# Patient Record
Sex: MALE | Race: WHITE | Employment: FULL TIME | URBAN - METROPOLITAN AREA
[De-identification: names, ages, dates, MRNs, and addresses within clinical notes are randomized per-mention and may not be internally consistent; named-entity substitution may affect disease eponyms.]

---

## 2017-12-08 ENCOUNTER — OFFICE VISIT (OUTPATIENT)
Dept: FAMILY MEDICINE | Facility: CLINIC | Age: 37
End: 2017-12-08

## 2017-12-08 VITALS
HEIGHT: 69 IN | SYSTOLIC BLOOD PRESSURE: 110 MMHG | OXYGEN SATURATION: 98 % | BODY MASS INDEX: 25.09 KG/M2 | DIASTOLIC BLOOD PRESSURE: 74 MMHG | TEMPERATURE: 98 F | HEART RATE: 67 BPM | WEIGHT: 169.4 LBS

## 2017-12-08 DIAGNOSIS — L65.9 HAIR LOSS: ICD-10-CM

## 2017-12-08 DIAGNOSIS — Z00.00 ENCOUNTER FOR ROUTINE ADULT HEALTH EXAMINATION WITHOUT ABNORMAL FINDINGS: Primary | ICD-10-CM

## 2017-12-08 DIAGNOSIS — H61.23 BILATERAL IMPACTED CERUMEN: ICD-10-CM

## 2017-12-08 DIAGNOSIS — F41.1 GAD (GENERALIZED ANXIETY DISORDER): ICD-10-CM

## 2017-12-08 PROCEDURE — 69209 REMOVE IMPACTED EAR WAX UNI: CPT | Mod: 50 | Performed by: PHYSICIAN ASSISTANT

## 2017-12-08 PROCEDURE — 99395 PREV VISIT EST AGE 18-39: CPT | Mod: 25 | Performed by: PHYSICIAN ASSISTANT

## 2017-12-08 RX ORDER — ELECTROLYTES/DEXTROSE
SOLUTION, ORAL ORAL
COMMUNITY
Start: 2017-12-08 | End: 2019-07-18

## 2017-12-08 RX ORDER — ASCORBIC ACID 500 MG
TABLET ORAL DAILY
Qty: 30 TABLET | COMMUNITY
Start: 2017-12-08 | End: 2019-07-18

## 2017-12-08 RX ORDER — OMEGA-3 FATTY ACIDS/FISH OIL 300-1000MG
1 CAPSULE ORAL DAILY
Qty: 90 CAPSULE | COMMUNITY
Start: 2017-12-08 | End: 2019-07-18

## 2017-12-08 ASSESSMENT — ANXIETY QUESTIONNAIRES
GAD7 TOTAL SCORE: 3
IF YOU CHECKED OFF ANY PROBLEMS ON THIS QUESTIONNAIRE, HOW DIFFICULT HAVE THESE PROBLEMS MADE IT FOR YOU TO DO YOUR WORK, TAKE CARE OF THINGS AT HOME, OR GET ALONG WITH OTHER PEOPLE: NOT DIFFICULT AT ALL
1. FEELING NERVOUS, ANXIOUS, OR ON EDGE: SEVERAL DAYS
3. WORRYING TOO MUCH ABOUT DIFFERENT THINGS: SEVERAL DAYS
7. FEELING AFRAID AS IF SOMETHING AWFUL MIGHT HAPPEN: NOT AT ALL
6. BECOMING EASILY ANNOYED OR IRRITABLE: NOT AT ALL
5. BEING SO RESTLESS THAT IT IS HARD TO SIT STILL: NOT AT ALL
2. NOT BEING ABLE TO STOP OR CONTROL WORRYING: NOT AT ALL

## 2017-12-08 ASSESSMENT — PATIENT HEALTH QUESTIONNAIRE - PHQ9
5. POOR APPETITE OR OVEREATING: SEVERAL DAYS
SUM OF ALL RESPONSES TO PHQ QUESTIONS 1-9: 6

## 2017-12-08 NOTE — PROGRESS NOTES
Chapincito Menjivar is a 37 year old male presents for routine health maintenance.    Current concerns:   Chuckie takes sertraline that is prescribed from psychiatrist. Continues to see psychiatrist, and has seen therapists in the past, but is not currently. Has been taking sertraline 100 mg daily and is tolerating well, happy with the effects. Also, has propranolol to use as needed for higher anxiety episodes. Takes 2 times per week. Would be interested in having me take over on prescribing these.      Also takes finasteride to help with hair growth. He is prescribed this by the provider who did his hair transplant surgery.     Body mass index is 25.38 kg/(m^2).    Present exercise habits:  3-5 times/week, at [employer]  Present dietary habits:  eats regular meals and follows a balanced nutrition diet    Vit D intake: is taking daily multivitamins.     Is the patient a smoker? No  If yes, smoking cessation advised and counseling provided.     Cardiovascular risk factors: none    Over the past few weeks, have you felt down or depressed? Little interest or pleasure in doing things? No    Last dental appointment:  this year  Last optical appointment:  Last year    Was the patient born between 0[birth year] and has not had Hep C testing?  No, not applicable    I have reviewed the following histories: Past Medical History, Past Surgical History, Social History, Family History, Problem List, Medication List and Allergies    Past Medical History:   Diagnosis Date     Dislocated shoulder     left     Family History   Problem Relation Age of Onset     Hypothyroidism Mother      ???     Cataracts Father      Gestational Diabetes Sister      Esophageal Cancer Sister      40     Hyperlipidemia No family hx of      Hypertension No family hx of      DIABETES No family hx of      Colon Cancer No family hx of      Social History     Social History     Marital status: Single     Spouse name: N/A     Number of children: N/A     Years of  "education: N/A     Occupational History           Social History Main Topics     Smoking status: Never Smoker     Smokeless tobacco: Never Used     Alcohol use 0.6 oz/week     1 Standard drinks or equivalent per week     Drug use: No     Sexual activity: Not Currently     Other Topics Concern     Not on file     Social History Narrative     Patient Active Problem List   Diagnosis     Health Care Home     ACP (advance care planning)     CORI (generalized anxiety disorder)     Hair loss     Current Outpatient Prescriptions   Medication     Multiple Vitamins-Minerals (MULTIVITAMIN ADULT) TABS     omega 3 1000 MG CAPS     ascorbic acid (VITAMIN C) 500 MG tablet     finasteride (PROPECIA) 1 MG tablet     propranolol (INDERAL) 10 MG tablet     sertraline (ZOLOFT) 100 MG tablet     No current facility-administered medications for this visit.        Allergies:  No Known Allergies      ROS:  E/M: NEGATIVE for ear, nose, mouth and throat problems  R: NEGATIVE for significant/chronic cough or SOB  CV: NEGATIVE for chest pain or palpitations  GI: NEGATIVE for abdominal pain, chronic diarrhea or constipation  : NEGATIVE for dysuria, hematuria, weakened urinary stream        OBJECTIVE:    Vitals:    12/08/17 1102   BP: 110/74   BP Location: Left arm   Patient Position: Chair   Cuff Size: Adult Large   Pulse: 67   Temp: 98  F (36.7  C)   TempSrc: Oral   SpO2: 98%   Weight: 76.8 kg (169 lb 6.4 oz)   Height: 1.74 m (5' 8.5\")       General: 37 year old male who appears his stated age. Vital signs noted  Head: Normocephalic  Eyes: pupils equal round reactive to light and accomodation, extra ocular movements intact  Ears: external canals and tms free of abnormalities  Nose: patent, without mucosal abnormalities  Mouth and throat: without erythema or lesions of the mucosa  Neck: supple, without adenopathy or thyromegaly  Lungs: clear to auscultation, no wheezing or crackles  Cv: regular rate and rhythm, normal s1 and s2 without " "murmur or click  Abd: soft, non-tender, no masses, no hepatomegaly or splenomegaly.  Gu: Declined  Ms: normal muscle tone & symmetry  Skin: Clear to inspection  Neuro: sensation and motor function grossly intact; cranial nerves without obvious abnormalities.        ASSESSMENT/PLAN:    1. Encounter for routine adult health examination without abnormal findings  Chuckie is doing well. At this time, would like to get records from psychiatrist, but at that point, would most likely be willing to take over prescribing mental health medications. Otherwise recommended fasting labs with next physical.      2. CORI (generalized anxiety disorder)    3. Hair loss    4. Bilateral impacted cerumen  - Removal Impacted Cerumen Irrigation Unilateral (Ear Wash)     reports that he has never smoked. He has never used smokeless tobacco.      Estimated body mass index is 25.38 kg/(m^2) as calculated from the following:    Height as of this encounter: 1.74 m (5' 8.5\").    Weight as of this encounter: 76.8 kg (169 lb 6.4 oz).  Weight management plan: Discussed healthy diet and exercise guidelines and patient will follow up in 6 months in clinic to re-evaluate.      Meds Suggested:      Vitamin D       Calcium  Tests Recommended:      Regular Dental Examinations        Eye exam  Behavior Modifications:       Cardiovascular exercise 3 times per week--enough to get your Target Heart rate  Other recommendations:     BMI noted and discussed      Regular testicle exam     Encouraged My Chart    The patient will return to the clinic if symptoms are changing or concern with follow up as discussed. The patient understands and agrees with the plan.      Chandrika Tello PA-C  12/8/2017          Counseling Resources:  ATP IV Guidelines  Pooled Cohorts Equation Calculator  Breast Cancer Risk Calculator  FRAX Risk Assessment  ICSI Preventive Guidelines  Dietary Guidelines for Americans, 2010  [employer] MyPlate\  "

## 2017-12-08 NOTE — MR AVS SNAPSHOT
"              After Visit Summary   12/8/2017    Chapincito Menjivar    MRN: [id number]           Patient Information     Date Of Birth          1980        Visit Information        Provider Department      12/8/2017 11:00 AM Chandrika Tello PA-C [facility]        Today's Diagnoses     Encounter for routine adult health examination without abnormal findings    -  1    CORI (generalized anxiety disorder)        Hair loss        Bilateral impacted cerumen           Follow-ups after your visit        Follow-up notes from your care team     Return in about 1 year (around 12/8/2018) for Physical Exam.      Who to contact     If you have questions or need follow up information about today's clinic visit or your schedule please contact [facility] directly at [phone].  Normal or non-critical lab and imaging results will be communicated to you by [facility], letter or phone within 4 business days after the clinic has received the results. If you do not hear from us within 7 days, please contact the clinic through MyChart or phone. If you have a critical or abnormal lab result, we will notify you by phone as soon as possible.  Submit refill requests through [facility] or call your pharmacy and they will forward the refill request to us. Please allow 3 business days for your refill to be completed.          Additional Information About Your Visit        [facility] Information     [facility] lets you send messages to your doctor, view your test results, renew your prescriptions, schedule appointments and more. To sign up, go to [email or web address] . Click on \"Log in\" on the left side of the screen, which will take you to the Welcome page. Then click on \"Sign up Now\" on the right side of the page.     You will be asked to enter the access code listed below, as well as some personal information. Please follow the directions to create your username and password.     Your access " "code is: [email or web address]  Expires: 3/8/2018 11:06 AM     Your access code will  in 90 days. If you need help or a new code, please call your [place] clinic or [phone].        Care EveryWhere ID     This is your Care EveryWhere ID. This could be used by other organizations to access your [facility] medical records  [id number]        Your Vitals Were     Pulse Temperature Height Pulse Oximetry BMI (Body Mass Index)       67 98  F (36.7  C) (Oral) 1.74 m (5' 8.5\") 98% 25.38 kg/m2        Blood Pressure from Last 3 Encounters:   17 110/74   16 112/72    Weight from Last 3 Encounters:   17 76.8 kg (169 lb 6.4 oz)   16 73.3 kg (161 lb 9.6 oz)              We Performed the Following     Removal Impacted Cerumen Irrigation Unilateral (Ear Wash)        Primary Care Provider Office Phone # Fax #    Chandrika Betty Tello PA-C [phone]       [facility] [address] [facility] [address]  [facility] [address]        Equal Access to Services     ABEBA OSCAR AH: Hadii aad ku hadasho Soomaali, waaxda luqadaha, qaybta kaalmada adeegyada, waxay tisha richards. So [facility] [phone].    ATENCIÓN: Si habla español, tiene a guerrero disposición servicios gratuitos de asistencia lingüística. Llame al [phone].    We comply with applicable federal civil rights laws and [place] laws. We do not discriminate on the basis of race, color, national origin, age, disability, sex, sexual orientation, or gender identity.            Thank you!     Thank you for choosing [facility]  for your care. Our goal is always to provide you with excellent care. Hearing back from our patients is one way we can continue to improve our services. Please take a few minutes to complete the written survey that you may receive in the mail after your visit with us. Thank you!             Your Updated Medication List - Protect others around you: Learn how to safely use, " store and throw away your medicines at www.disposemymeds.org.          This list is accurate as of: 12/8/17 11:59 PM.  Always use your most recent med list.                   Brand Name Dispense Instructions for use Diagnosis    ascorbic acid 500 MG tablet    VITAMIN C    30 tablet    Take by mouth daily        finasteride 1 MG tablet    PROPECIA     Take 1 mg by mouth daily        MULTIVITAMIN ADULT Tabs           omega 3 1000 MG Caps     90 capsule    Take 1 g by mouth daily        propranolol 10 MG tablet    INDERAL     Take 10 mg by mouth as needed        sertraline 100 MG tablet    ZOLOFT     Take 100 mg by mouth daily

## 2017-12-08 NOTE — NURSING NOTE
Chapincito is here for a PX, pt is not fasting    Patient is here for a full physical exam.    Pre-Visit Screening :  Immunizations : up to date  Colon Screening : na    Asthma Action Test/Plan : na  PHQ9/GAD7 : phq2/ given      Vitals:  Pulse - regular  My Chart - accepts    CLASSIFICATION OF OVERWEIGHT AND OBESITY BY BMI                         Obesity Class           BMI(kg/m2)  Underweight                                    < 18.5  Normal                                         18.5-24.9  Overweight                                     25.0-29.9  OBESITY                     I                  30.0-34.9                              II                 35.0-39.9  EXTREME OBESITY             III                >40                             Patient's  BMI There is no height or weight on file to calculate BMI.  http://hin.nhlbi.nih.gov/menuplanner/menu.cgi  Questioned patient about current smoking habits.  Pt. has never smoked.    ETOH screening:  Questions:  1-How often do you have a drink containing alcohol?                             2 times per week(s)  2-How many drinks containing alcohol do you have on a typical day when you are         Drinking?                              1   3- How often do you have 5 or more drinks on one occasion?                              0 per months    Have you ever:  None of the patient's responses to the CAGE screening were positive / Negative CAGE score     The patient has verbalized that it is ok to leave a detailed voice message on the patient's cell phone with results/recommendations from this visit.       Verified [phone] phone number:

## 2017-12-09 PROBLEM — F41.1 GAD (GENERALIZED ANXIETY DISORDER): Status: ACTIVE | Noted: 2017-12-09

## 2017-12-09 PROBLEM — L65.9 HAIR LOSS: Status: ACTIVE | Noted: 2017-12-09

## 2017-12-09 ASSESSMENT — ANXIETY QUESTIONNAIRES: GAD7 TOTAL SCORE: 3

## 2018-12-13 ENCOUNTER — OFFICE VISIT (OUTPATIENT)
Dept: FAMILY MEDICINE | Facility: CLINIC | Age: 38
End: 2018-12-13

## 2018-12-13 VITALS
WEIGHT: 150 LBS | HEIGHT: 68 IN | TEMPERATURE: 98.5 F | BODY MASS INDEX: 22.73 KG/M2 | HEART RATE: 72 BPM | DIASTOLIC BLOOD PRESSURE: 62 MMHG | SYSTOLIC BLOOD PRESSURE: 118 MMHG

## 2018-12-13 DIAGNOSIS — Z13.220 SCREENING FOR LIPID DISORDERS: ICD-10-CM

## 2018-12-13 DIAGNOSIS — Z00.00 ENCOUNTER FOR GENERAL MEDICAL EXAMINATION: Primary | ICD-10-CM

## 2018-12-13 DIAGNOSIS — Z13.228 SCREENING FOR METABOLIC DISORDER: ICD-10-CM

## 2018-12-13 PROCEDURE — 99395 PREV VISIT EST AGE 18-39: CPT | Performed by: PHYSICIAN ASSISTANT

## 2018-12-13 SDOH — HEALTH STABILITY: MENTAL HEALTH: HOW OFTEN DO YOU HAVE A DRINK CONTAINING ALCOHOL?: 2-3 TIMES A WEEK

## 2018-12-13 SDOH — HEALTH STABILITY: MENTAL HEALTH: HOW MANY STANDARD DRINKS CONTAINING ALCOHOL DO YOU HAVE ON A TYPICAL DAY?: 1 OR 2

## 2018-12-13 ASSESSMENT — ANXIETY QUESTIONNAIRES
6. BECOMING EASILY ANNOYED OR IRRITABLE: SEVERAL DAYS
3. WORRYING TOO MUCH ABOUT DIFFERENT THINGS: SEVERAL DAYS
7. FEELING AFRAID AS IF SOMETHING AWFUL MIGHT HAPPEN: NOT AT ALL
IF YOU CHECKED OFF ANY PROBLEMS ON THIS QUESTIONNAIRE, HOW DIFFICULT HAVE THESE PROBLEMS MADE IT FOR YOU TO DO YOUR WORK, TAKE CARE OF THINGS AT HOME, OR GET ALONG WITH OTHER PEOPLE: NOT DIFFICULT AT ALL
2. NOT BEING ABLE TO STOP OR CONTROL WORRYING: NOT AT ALL
5. BEING SO RESTLESS THAT IT IS HARD TO SIT STILL: NOT AT ALL
GAD7 TOTAL SCORE: 4
1. FEELING NERVOUS, ANXIOUS, OR ON EDGE: SEVERAL DAYS

## 2018-12-13 ASSESSMENT — PATIENT HEALTH QUESTIONNAIRE - PHQ9
SUM OF ALL RESPONSES TO PHQ QUESTIONS 1-9: 2
5. POOR APPETITE OR OVEREATING: SEVERAL DAYS

## 2018-12-13 ASSESSMENT — MIFFLIN-ST. JEOR: SCORE: 1569.9

## 2018-12-13 NOTE — PROGRESS NOTES
Chapincito Menjivar is a 38 year old male presents for routine health maintenance.    Current concerns: None.    Body mass index is 22.81 kg/m .    Present exercise habits:  1-3 times/week  Present dietary habits:  eats regular meals and follows a balanced nutrition diet    Vit D intake: is not taking supplement    Is the patient a smoker? No  If yes, smoking cessation advised and counseling provided.     Cardiovascular risk factors: none    Over the past few weeks, have you felt down or depressed? Little interest or pleasure in doing things? No concerns. Going off sertraline as he is doing so well.    Last dental appointment:  this year  Last optical appointment:  this year    Was the patient born between 8[birth year] and has not had Hep C testing?  No, not applicable    I have reviewed the following histories: Past Medical History, Past Surgical History, Social History, Family History, Problem List, Medication List and Allergies    Past Medical History:   Diagnosis Date     Dislocated shoulder     left     Family History   Problem Relation Age of Onset     Hypothyroidism Mother         ???     Cataracts Father      Gestational Diabetes Sister      Esophageal Cancer Sister         40     Hyperlipidemia No family hx of      Hypertension No family hx of      Diabetes No family hx of      Colon Cancer No family hx of      Prostate Cancer No family hx of      Social History     Socioeconomic History     Marital status: Single     Spouse name: Not on file     Number of children: Not on file     Years of education: Not on file     Highest education level: Not on file   Social Needs     Financial resource strain: Not on file     Food insecurity - worry: Not on file     Food insecurity - inability: Not on file     Transportation needs - medical: Not on file     Transportation needs - non-medical: Not on file   Occupational History     Occupation:    Tobacco Use     Smoking status: Never Smoker     Smokeless tobacco:  "Never Used   Substance and Sexual Activity     Alcohol use: Yes     Alcohol/week: 0.6 oz     Types: 1 Standard drinks or equivalent per week     Frequency: 2-3 times a week     Drinks per session: 1 or 2     Drug use: No     Sexual activity: Yes     Partners: Female     Birth control/protection: Condom   Other Topics Concern     Not on file   Social History Narrative     Not on file     Patient Active Problem List   Diagnosis     Health Care Home     ACP (advance care planning)     CORI (generalized anxiety disorder)     Hair loss     Current Outpatient Medications   Medication     ascorbic acid (VITAMIN C) 500 MG tablet     finasteride (PROPECIA) 1 MG tablet     Multiple Vitamins-Minerals (MULTIVITAMIN ADULT) TABS     omega 3 1000 MG CAPS     propranolol (INDERAL) 10 MG tablet     sertraline (ZOLOFT) 100 MG tablet     No current facility-administered medications for this visit.        Allergies:  No Known Allergies      ROS:  E/M: NEGATIVE for ear, nose, mouth and throat problems  R: NEGATIVE for significant/chronic cough or SOB  CV: NEGATIVE for chest pain or palpitations  GI: NEGATIVE for abdominal pain, chronic diarrhea or constipation  : NEGATIVE for dysuria, hematuria, weakened urinary stream      OBJECTIVE:    Vitals:    12/13/18 1106   BP: 118/62   BP Location: Left arm   Patient Position: Sitting   Cuff Size: Adult Large   Pulse: 72   Temp: 98.5  F (36.9  C)   TempSrc: Oral   Weight: 68 kg (150 lb)   Height: 1.727 m (5' 8\")       General: 38 year old male who appears his stated age. Vital signs noted.  Head: Normocephalic  Eyes: pupils equal round reactive to light and accomodation, extra ocular movements intact  Ears: external canals and tms free of abnormalities  Nose: patent, without mucosal abnormalities  Mouth and throat: without erythema or lesions of the mucosa  Neck: supple, without adenopathy or thyromegaly  Lungs: clear to auscultation, no wheezing or crackles  Cv: regular rate and rhythm, normal " "s1 and s2 without murmur or click  Abd: soft, non-tender, no masses, no hepatomegaly or splenomegaly.  Gu: normal external genitalia, no hernia  Ms: normal muscle tone & symmetry  Skin: Clear to inspection  Neuro: sensation and motor function grossly intact; cranial nerves without obvious abnormalities.        ASSESSMENT/PLAN:    1. Encounter for general medical examination  Chuckie is doing well today. He will return in 2 days fasting for fasting labs. I will return results to him via [employer] as he is planning to sign up for this today. Otherwise, I will send letter.     2. Screening for lipid disorders  - Lipid Profile ([facility]); Standing  - VENOUS COLLECTION; Standing    3. Screening for metabolic disorder  - BASIC METABOLIC PANEL ([facility]); Standing  - VENOUS COLLECTION; Standing     reports that  has never smoked. he has never used smokeless tobacco.      Estimated body mass index is 22.81 kg/m  as calculated from the following:    Height as of this encounter: 1.727 m (5' 8\").    Weight as of this encounter: 68 kg (150 lb).        Labs pending:      Fasting glucose      Fasting lipids  Meds Suggested:      Vitamin D       Calcium  Tests Recommended:      Regular Dental Examinations        Eye exam  Behavior Modifications:       Cardiovascular exercise 3 times per week--enough to get your Target Heart rate  Other recommendations:     BMI noted and discussed      Regular testicle exam     Encouraged My Chart    The patient will return to the clinic if symptoms are changing or concern with follow up as discussed. The patient understands and agrees with the plan.      Chandrika Tello PA-C  12/13/2018          Counseling Resources:  ATP IV Guidelines  Pooled Cohorts Equation Calculator  Breast Cancer Risk Calculator  FRAX Risk Assessment  ICSI Preventive Guidelines  Dietary Guidelines for Americans, 2010  USDA's MyPlate    "

## 2018-12-13 NOTE — NURSING NOTE
Chuckie is here for a non fasting PX          Patient is here for a full physical exam.    Pre-Visit Screening :  Immunizations : up to date  Colon Screening : NA  Mammogram: NA  Asthma Action Test/Plan : NA  PHQ9 :  Done today  GAD7 :  Done today  Patient's  BMI Body mass index is 22.81 kg/m .  Questioned patient about current smoking habits.  Pt. has never smoked.  OK to leave a detailed voice message regarding today's visit Yes, phone # [phone]      ETOH screening:  Questions:  1-How often do you have a drink containing alcohol?                             2 times per week(s)  2-How many drinks containing alcohol do you have on a typical day when you are         Drinking?                              2   3- How often do you have 5 or more drinks on one occasion?                              never

## 2018-12-14 ASSESSMENT — ANXIETY QUESTIONNAIRES: GAD7 TOTAL SCORE: 4

## 2018-12-15 ENCOUNTER — OFFICE VISIT (OUTPATIENT)
Dept: FAMILY MEDICINE | Facility: CLINIC | Age: 38
End: 2018-12-15

## 2018-12-15 DIAGNOSIS — Z13.220 SCREENING FOR LIPID DISORDERS: ICD-10-CM

## 2018-12-15 DIAGNOSIS — Z13.228 SCREENING FOR METABOLIC DISORDER: ICD-10-CM

## 2018-12-15 PROCEDURE — 80061 LIPID PANEL: CPT | Mod: 90 | Performed by: PHYSICIAN ASSISTANT

## 2018-12-15 PROCEDURE — 36415 COLL VENOUS BLD VENIPUNCTURE: CPT | Performed by: PHYSICIAN ASSISTANT

## 2018-12-15 PROCEDURE — 80048 BASIC METABOLIC PNL TOTAL CA: CPT | Mod: 90 | Performed by: PHYSICIAN ASSISTANT

## 2018-12-15 NOTE — LETTER
[facility]  [facility] of [facility]  [facility] [address]  [place]  [address]    December 17, 2018        Chapincito Menjivar  [facility] [address]              Dear Chuckie,    It was nice to see you the other day. All of your lab results have returned.    Your basic metabolic panel (BMP), which looks at your kidney function, electrolyte levels, and fasting glucose (blood sugar) level is within normal limits.    Your lipid/cholesterol profile did show your total and LDL (bad) cholesterol were mildly elevated. The goal for your total cholesterol is <200. The goal for your HDL (good) cholesterol, which is often an indication of exercise, is >40 like yours, but is even better when it is >50. The goal for your LDL (bad) cholesterol is <130. Finally, the goal for your triglycerides, which are usually a reflection of the fat content in your diet, is <150.     Base on these results, I have no immediate concerns, but I would encourage you to work on finding positive ways to change your diet choices and activity level with the goal of weight loss and overall better health. I would like to check these fasting levels again in 1-2 years.     If you have any further questions or problems, please contact our office at [phone].          Chandrika Tello PA-C

## 2018-12-16 LAB
BUN SERPL-MCNC: 16 MG/DL (ref 7–25)
BUN/CREATININE RATIO: NORMAL (CALC) (ref 6–22)
CALCIUM SERPL-MCNC: 9.6 MG/DL (ref 8.6–10.3)
CHLORIDE SERPLBLD-SCNC: 103 MMOL/L (ref 98–110)
CHOLEST SERPL-MCNC: 209 MG/DL
CHOLEST/HDLC SERPL: 4.6 (CALC)
CO2 SERPL-SCNC: 28 MMOL/L (ref 20–32)
CREAT SERPL-MCNC: 1.07 MG/DL (ref 0.6–1.35)
EGFR AFRICAN AMERICAN - QUEST: 101 ML/MIN/1.73M2
GFR SERPL CREATININE-BSD FRML MDRD: 87 ML/MIN/1.73M2
GLUCOSE - QUEST: 88 MG/DL (ref 65–99)
HDLC SERPL-MCNC: 45 MG/DL
LDLC SERPL CALC-MCNC: 148 MG/DL (CALC)
NONHDLC SERPL-MCNC: 164 MG/DL (CALC)
POTASSIUM SERPL-SCNC: 4.8 MMOL/L (ref 3.5–5.3)
SODIUM SERPL-SCNC: 140 MMOL/L (ref 135–146)
TRIGL SERPL-MCNC: 68 MG/DL

## 2019-07-18 ENCOUNTER — OFFICE VISIT (OUTPATIENT)
Dept: FAMILY MEDICINE | Facility: CLINIC | Age: 39
End: 2019-07-18
Payer: COMMERCIAL

## 2019-07-18 VITALS
WEIGHT: 153.4 LBS | HEART RATE: 67 BPM | BODY MASS INDEX: 22.72 KG/M2 | OXYGEN SATURATION: 94 % | DIASTOLIC BLOOD PRESSURE: 74 MMHG | HEIGHT: 69 IN | TEMPERATURE: 98.9 F | SYSTOLIC BLOOD PRESSURE: 122 MMHG | RESPIRATION RATE: 20 BRPM

## 2019-07-18 DIAGNOSIS — N52.9 ERECTILE DYSFUNCTION, UNSPECIFIED ERECTILE DYSFUNCTION TYPE: ICD-10-CM

## 2019-07-18 DIAGNOSIS — F41.1 GAD (GENERALIZED ANXIETY DISORDER): Primary | ICD-10-CM

## 2019-07-18 LAB
BUN SERPL-MCNC: 14.7 MG/DL (ref 7–21)
CALCIUM SERPL-MCNC: 9.5 MG/DL (ref 8.5–10.1)
CHLORIDE SERPLBLD-SCNC: 101 MMOL/L (ref 98–110)
CO2 SERPL-SCNC: 27.6 MMOL/L (ref 20–32)
CREAT SERPL-MCNC: 1 MG/DL (ref 0.7–1.3)
GFR SERPL CREATININE-BSD FRML MDRD: 88 ML/MIN/1.7 M2
GLUCOSE SERPL-MCNC: 98.2 MG'DL (ref 70–99)
POTASSIUM SERPL-SCNC: 4 MMOL/DL (ref 3.2–4.6)
SODIUM SERPL-SCNC: 137.2 MMOL/L (ref 132–142)
TSH SERPL DL<=0.05 MIU/L-ACNC: 2.48 UIU/ML (ref 0.3–5)

## 2019-07-18 RX ORDER — SILDENAFIL 100 MG/1
TABLET, FILM COATED ORAL
Qty: 10 TABLET | Refills: 0 | Status: SHIPPED | OUTPATIENT
Start: 2019-07-18 | End: 2020-10-27

## 2019-07-18 ASSESSMENT — ANXIETY QUESTIONNAIRES
1. FEELING NERVOUS, ANXIOUS, OR ON EDGE: SEVERAL DAYS
GAD7 TOTAL SCORE: 8
2. NOT BEING ABLE TO STOP OR CONTROL WORRYING: SEVERAL DAYS
7. FEELING AFRAID AS IF SOMETHING AWFUL MIGHT HAPPEN: NOT AT ALL
IF YOU CHECKED OFF ANY PROBLEMS ON THIS QUESTIONNAIRE, HOW DIFFICULT HAVE THESE PROBLEMS MADE IT FOR YOU TO DO YOUR WORK, TAKE CARE OF THINGS AT HOME, OR GET ALONG WITH OTHER PEOPLE: SOMEWHAT DIFFICULT
3. WORRYING TOO MUCH ABOUT DIFFERENT THINGS: MORE THAN HALF THE DAYS
6. BECOMING EASILY ANNOYED OR IRRITABLE: SEVERAL DAYS
5. BEING SO RESTLESS THAT IT IS HARD TO SIT STILL: SEVERAL DAYS

## 2019-07-18 ASSESSMENT — PATIENT HEALTH QUESTIONNAIRE - PHQ9
SUM OF ALL RESPONSES TO PHQ QUESTIONS 1-9: 5
5. POOR APPETITE OR OVEREATING: MORE THAN HALF THE DAYS

## 2019-07-18 ASSESSMENT — MIFFLIN-ST. JEOR: SCORE: 1592.07

## 2019-07-18 ASSESSMENT — PAIN SCALES - GENERAL: PAINLEVEL: NO PAIN (0)

## 2019-07-18 NOTE — PROGRESS NOTES
"Subjective: Chapincito Menjivar is a 39 year old who presents today for a few concerns.    He has been diagnosed with generalized anxiety disorder in the past and previously was taking sertraline and episodic propranolol.  He found this somewhat helpful but at this point he is been going to [facility] counseling and this is been more effective.  This is not interfering with work.    More concerning is some degree of erectile dysfunction.  The patient got  in December and is only able to sustain an erection and have ejaculation perhaps 50% of the time when having intercourse with his wife.  He does have nocturnal erections and nocturnal admissions.  He has no history of vascular disease.  Notably, his sister was diagnosed with diabetes at the age of 38 and this is type 2 diabetes.  Other than her there are no family members with high blood pressure, diabetes or vascular disease.    He does feel that performance anxiety is feeding into this problem and at this point he says it is causing some degree of sadness for his wife but not causing marital tension.    PMHX/PSHX/MEDS/ALLERGIES/SHX/FHX reviewed and updated in Epic.   ROS:   General: No fevers, chills   Head: No headache   CV: No chest pain or palpitations.   Resp: No shortness of breath. No cough. No hemoptysis.   GI: No nausea or vomiting.  No constipation or diarrhea.  Objective: /74   Pulse 67   Temp 98.9  F (37.2  C) (Oral)   Resp 20   Ht 1.746 m (5' 8.74\")   Wt 69.6 kg (153 lb 6.4 oz)   SpO2 94%   BMI 22.82 kg/m     Gen: Well nourished and in NAD   CV: RRR - no murmurs, rubs, or gallups  Pulm: Clear to auscultation without wheezing or crackles  ABD: soft, nontender, BS intact  Extrem: no cyanosis, edema or clubbing   Psych: Euthymic    Assessment/ Plan:  1. CORI (generalized anxiety disorder)  Continue with counseling.    2. Erectile dysfunction, unspecified erectile dysfunction type  Check laboratory tests to rule out underlying conditions. "  I did  the patient on steps that he and his wife could take for addressing this problem and also recommended a short trial of Viagra.  I will have him follow-up in a month.  - Thyroid [place] ([facility])  - Testosterone F / T ([facility])  - Basic Metabolic Panel ([facility])  - Results < 1 hr  - sildenafil (VIAGRA) 100 MG tablet; Take 1/2 to 1 pill as needed.  Dispense: 10 tablet; Refill: 0    Total of 35 minutes was spent in face to face contact with patient with > 50% in counseling and coordination of care.  Options for treatment and/or follow-up care were reviewed with the patient. Chapincito Menjivar was engaged and actively involved in the decision making process. He verbalized understanding of the options discussed and was satisfied with the final plan.    Giorgio Cali

## 2019-07-18 NOTE — LETTER
July 23, 2019      Chapincito Menjivar  [address] [facility] [address]        Dear Chapincito,    Please see below for your test results.    All of the blood test came back normal.  There is a normal testosterone, normal thyroid, and normal kidney function.  Please follow-up in the clinic in about a month. These results are within the normal range for you.  Please follow up in the clinic in the next month.     Resulted Orders   Thyroid [place] ([facility])   Result Value Ref Range    TSH 2.48 0.30 - 5.00 uIU/mL    Narrative    Test performed by:  [facility]  [address] [place] [address]   Testosterone F / T ([facility])   Result Value Ref Range    Testosterone Total 563 240 - 950 ng/dL      Comment:      This test was developed and its performance characteristics determined by the  [facility],  [facility]. It has  not been cleared or approved by the FDA. The laboratory is regulated under   CLIA  as qualified to perform high-complexity testing. This test is used for   clinical  purposes. It should not be regarded as investigational or for research.      SEX HORMONE BIND GLOBULIN 41 11 - 80 nmol/L    FREE TESTOSTERONE CALC 10.52 4.7 - 24.4 ng/dL      Comment:      Performed and/or entered by:  [address] [address]       Narrative    Test performed by:  [id number]  [address], [place] [address]   Basic Metabolic Panel ([facility])  - Results < 1 hr   Result Value Ref Range    Urea Nitrogen 14.7 7.0 - 21.0 mg/dL    Calcium 9.5 8.5 - 10.1 mg/dL    Chloride 101.0 98.0 - 110.0 mmol/L    Carbon Dioxide 27.6 20.0 - 32.0 mmol/L    Creatinine 1.0 0.7 - 1.3 mg/dL    Glucose 98.2 70.0 - 99.0 mg'dL    Potassium 4.0 3.2 - 4.6 mmol/dL    Sodium 137.2 132.0 - 142.0 mmol/L    GFR Estimate 88.0 >60.0 mL/min/1.7 m2    GFR Estimate If Black >90 >60.0 mL/min/1.7 m2       If  you have any questions, please call the clinic to make an appointment.    Sincerely,    Giorgio Cali MD

## 2019-07-19 ASSESSMENT — ANXIETY QUESTIONNAIRES: GAD7 TOTAL SCORE: 8

## 2019-07-22 NOTE — RESULT ENCOUNTER NOTE
These results are within the normal range for you.  Please follow up in the clinic in the next month.

## 2019-07-23 LAB
FREE TESTOSTERONE CALC: 10.52 NG/DL (ref 4.7–24.4)
SEX HORMONE BIND GLOBULIN: 41 NMOL/L (ref 11–80)
TESTOST SERPL-MCNC: 563 NG/DL (ref 240–950)

## 2019-07-23 NOTE — RESULT ENCOUNTER NOTE
All of the blood test came back normal.  There is a normal testosterone, normal thyroid, and normal kidney function.  Please follow-up in the clinic in about a month.

## 2020-04-29 ENCOUNTER — TELEPHONE (OUTPATIENT)
Dept: FAMILY MEDICINE | Facility: CLINIC | Age: 40
End: 2020-04-29

## 2020-04-29 NOTE — TELEPHONE ENCOUNTER
Covid-19 outreach call  Pt called and a message left stating that we are open to help with any healthcare needs.

## 2020-10-27 ENCOUNTER — OFFICE VISIT (OUTPATIENT)
Dept: FAMILY MEDICINE | Facility: CLINIC | Age: 40
End: 2020-10-27

## 2020-10-27 VITALS
DIASTOLIC BLOOD PRESSURE: 60 MMHG | SYSTOLIC BLOOD PRESSURE: 112 MMHG | TEMPERATURE: 99.1 F | BODY MASS INDEX: 23.11 KG/M2 | WEIGHT: 156 LBS | OXYGEN SATURATION: 98 % | HEART RATE: 71 BPM | HEIGHT: 69 IN

## 2020-10-27 DIAGNOSIS — N52.9 ERECTILE DYSFUNCTION, UNSPECIFIED ERECTILE DYSFUNCTION TYPE: ICD-10-CM

## 2020-10-27 DIAGNOSIS — Z00.00 ROUTINE GENERAL MEDICAL EXAMINATION AT A HEALTH CARE FACILITY: Primary | ICD-10-CM

## 2020-10-27 PROCEDURE — 99396 PREV VISIT EST AGE 40-64: CPT | Performed by: PHYSICIAN ASSISTANT

## 2020-10-27 RX ORDER — SILDENAFIL 100 MG/1
TABLET, FILM COATED ORAL
Qty: 10 TABLET | Refills: 1 | Status: SHIPPED | OUTPATIENT
Start: 2020-10-27 | End: 2021-11-18

## 2020-10-27 SDOH — HEALTH STABILITY: MENTAL HEALTH: HOW OFTEN DO YOU HAVE A DRINK CONTAINING ALCOHOL?: 2-4 TIMES A MONTH

## 2020-10-27 SDOH — HEALTH STABILITY: MENTAL HEALTH: HOW OFTEN DO YOU HAVE 6 OR MORE DRINKS ON ONE OCCASION?: NOT ASKED

## 2020-10-27 ASSESSMENT — MIFFLIN-ST. JEOR: SCORE: 1595.05

## 2020-10-27 NOTE — NURSING NOTE
Chuckie is here for a non fasting px.        Pre-visit Screening:  Immunizations:  up to date  Colonoscopy:  NA  Mammogram: NA  Asthma Action Test/Plan:  NA  PHQ9:  NA  GAD7:  NA  Questioned patient about current smoking habits Pt. has never smoked.  Ok to leave detailed message on voice mail for today's visit only Yes, phone # cell

## 2020-10-27 NOTE — PROGRESS NOTES
Chapincito Menjivar is a 40 year old male presents for routine health maintenance.    Current concerns: No concerns other than would like refills of sildenafil. This works well when needed.     Body mass index is 23.37 kg/m .    Present exercise habits:  3-5 times/week  Present dietary habits:  eats regular meals and follows a balanced nutrition diet    Vit D intake: is not taking supplement    Is the patient a smoker? No  If yes, smoking cessation advised and counseling provided.     Cardiovascular risk factors: lipids    Over the past few weeks, have you felt down or depressed? Little interest or pleasure in doing things? No concern     Last dental appointment:  this year  Last optical appointment:  2 years ago    Was the patient born between [birth year] and has not had Hep C testing?  No, not applicable    I have reviewed the following histories: Past Medical History, Past Surgical History, Social History, Family History, Problem List, Medication List and Allergies    Past Medical History:   Diagnosis Date     Anxiety      Dislocated shoulder     left     Gastroesophageal reflux disease      Family History   Problem Relation Age of Onset     Hypothyroidism Mother         ???     Cataracts Father      Gestational Diabetes Sister      Diabetes Sister      No Known Problems Brother      Esophageal Cancer Sister         40     No Known Problems Maternal Grandmother      No Known Problems Maternal Grandfather      Cancer Paternal Grandmother         unknown     No Known Problems Other      No Known Problems Paternal Grandfather      Hyperlipidemia No family hx of      Hypertension No family hx of      Colon Cancer No family hx of      Prostate Cancer No family hx of      Coronary Artery Disease No family hx of      Heart Disease No family hx of      Kidney Disease No family hx of      Cerebrovascular Disease No family hx of      Obesity No family hx of      Thrombosis No family hx of      Asthma No family hx of       Arthritis No family hx of      Thyroid Disease No family hx of      Depression No family hx of      Mental Illness No family hx of      Substance Abuse No family hx of      Cystic Fibrosis No family hx of      Early Death No family hx of      Coronary Artery Disease Early Onset No family hx of      Heart Failure No family hx of      Bleeding Diathesis No family hx of      Dementia No family hx of      Breast Cancer No family hx of      Ovarian Cancer No family hx of      Uterine Cancer No family hx of      Colorectal Cancer No family hx of      Pancreatic Cancer No family hx of      Lung Cancer No family hx of      Melanoma No family hx of      Autoimmune Disease No family hx of      Unknown/Adopted No family hx of      Genetic Disorder No family hx of      Social History     Socioeconomic History     Marital status: Single     Spouse name: Not on file     Number of children: Not on file     Years of education: Not on file     Highest education level: Not on file   Occupational History     Occupation:    Social Needs     Financial resource strain: Not on file     Food insecurity     Worry: Not on file     Inability: Not on file     Transportation needs     Medical: Not on file     Non-medical: Not on file   Tobacco Use     Smoking status: Never Smoker     Smokeless tobacco: Never Used   Substance and Sexual Activity     Alcohol use: Yes     Alcohol/week: 1.0 standard drinks     Types: 1 Standard drinks or equivalent per week     Frequency: 2-4 times a month     Drinks per session: 1 or 2     Comment: Socially      Drug use: No     Sexual activity: Yes     Partners: Female     Birth control/protection: Condom     Comment: wife   Lifestyle     Physical activity     Days per week: Not on file     Minutes per session: Not on file     Stress: Not on file   Relationships     Social connections     Talks on phone: Not on file     Gets together: Not on file     Attends Hoahaoism service: Not on file     Active  "member of club or organization: Not on file     Attends meetings of clubs or organizations: Not on file     Relationship status: Not on file     Intimate partner violence     Fear of current or ex partner: Not on file     Emotionally abused: Not on file     Physically abused: Not on file     Forced sexual activity: Not on file   Other Topics Concern     Not on file   Social History Narrative     Not on file     Patient Active Problem List   Diagnosis     Health Care Home     ACP (advance care planning)     CORI (generalized anxiety disorder)     Hair loss     Current Outpatient Medications   Medication     sildenafil (VIAGRA) 100 MG tablet     No current facility-administered medications for this visit.        Allergies:  No Known Allergies    ROS:  E/M: NEGATIVE for ear, nose, mouth and throat problems  R: NEGATIVE for significant/chronic cough or SOB  CV: NEGATIVE for chest pain or palpitations  GI: NEGATIVE for abdominal pain, chronic diarrhea or constipation  : NEGATIVE for dysuria, hematuria, weakened urinary stream      OBJECTIVE:    Vitals:    10/27/20 1220   BP: 112/60   BP Location: Left arm   Pulse: 71   Temp: 99.1  F (37.3  C)   TempSrc: Oral   SpO2: 98%   Weight: 70.8 kg (156 lb)   Height: 1.74 m (5' 8.5\")       General: 40 year old male who appears his stated age. Vital signs noted  Head: Normocephalic  Eyes: pupils equal round reactive to light and accomodation, extra ocular movements intact  Ears: external canals and tms free of abnormalities  Nose: patent, without mucosal abnormalities  Mouth and throat: without erythema or lesions of the mucosa  Neck: supple, without adenopathy or thyromegaly  Lungs: clear to auscultation, no wheezing or crackles  Cv: regular rate and rhythm, normal s1 and s2 without murmur or click  Abd: soft, non-tender, no masses, no hepatomegaly or splenomegaly.  Gu: normal external genitalia, no hernia  Rectal: Not indicated  Ms: normal muscle tone & symmetry  Skin: Clear to " "inspection  Neuro: sensation and motor function grossly intact; cranial nerves without obvious abnormalities.        ASSESSMENT/PLAN:    1. Routine general medical examination at a health care facility  Chuckie is doing well today. Considered rechecking fasting labs today, but agreed to wait until next year as patient is not fasting today. Recommended flu shot especially since he and his wife are trying to get pregnant, but patient declined.     2. Erectile dysfunction, unspecified erectile dysfunction type  Agreed to refill.   - sildenafil (VIAGRA) 100 MG tablet; Take 0.5-1 ( mg) tablets by mouth 1 hour prior to intercourse. Max dose: 2 tabs/24 hours.  Dispense: 10 tablet; Refill: 1     reports that he has never smoked. He has never used smokeless tobacco.    Estimated body mass index is 23.37 kg/m  as calculated from the following:    Height as of this encounter: 1.74 m (5' 8.5\").    Weight as of this encounter: 70.8 kg (156 lb).    Meds Suggested:      Vitamin D       Calcium  Tests Recommended:      Regular Dental Examinations        Eye exam  Behavior Modifications:       Cardiovascular exercise 3 times per week--enough to get your Target Heart rate  Other recommendations:     BMI noted and discussed      Regular testicle exam     Encouraged My Chart    The patient will return to the clinic if symptoms are changing or concern with follow up as discussed. The patient understands and agrees with the plan.      Chandrika Tello PA-C  10/27/2020          Counseling Resources:  ATP IV Guidelines  Pooled Cohorts Equation Calculator  Breast Cancer Risk Calculator  FRAX Risk Assessment  ICSI Preventive Guidelines  Dietary Guidelines for Americans, 2010  USDA's MyPlate    "

## 2021-06-12 ENCOUNTER — IMMUNIZATION (OUTPATIENT)
Dept: LAB | Facility: CLINIC | Age: 41
End: 2021-06-12
Payer: COMMERCIAL

## 2021-09-26 ENCOUNTER — HEALTH MAINTENANCE LETTER (OUTPATIENT)
Age: 41
End: 2021-09-26

## 2021-11-18 ENCOUNTER — OFFICE VISIT (OUTPATIENT)
Dept: FAMILY MEDICINE | Facility: CLINIC | Age: 41
End: 2021-11-18

## 2021-11-18 VITALS
HEART RATE: 69 BPM | OXYGEN SATURATION: 98 % | HEIGHT: 68 IN | BODY MASS INDEX: 22.88 KG/M2 | TEMPERATURE: 97.8 F | SYSTOLIC BLOOD PRESSURE: 110 MMHG | DIASTOLIC BLOOD PRESSURE: 68 MMHG | WEIGHT: 151 LBS

## 2021-11-18 DIAGNOSIS — Z00.00 ENCOUNTER FOR GENERAL MEDICAL EXAMINATION: Primary | ICD-10-CM

## 2021-11-18 DIAGNOSIS — N52.9 ERECTILE DYSFUNCTION, UNSPECIFIED ERECTILE DYSFUNCTION TYPE: ICD-10-CM

## 2021-11-18 DIAGNOSIS — F98.8 ATTENTION DEFICIT DISORDER (ADD) WITHOUT HYPERACTIVITY: ICD-10-CM

## 2021-11-18 PROCEDURE — 99396 PREV VISIT EST AGE 40-64: CPT | Performed by: PHYSICIAN ASSISTANT

## 2021-11-18 RX ORDER — SILDENAFIL 100 MG/1
TABLET, FILM COATED ORAL
Qty: 15 TABLET | Refills: 3 | Status: SHIPPED | OUTPATIENT
Start: 2021-11-18

## 2021-11-18 RX ORDER — DEXTROAMPHETAMINE SACCHARATE, AMPHETAMINE ASPARTATE MONOHYDRATE, DEXTROAMPHETAMINE SULFATE AND AMPHETAMINE SULFATE 3.75; 3.75; 3.75; 3.75 MG/1; MG/1; MG/1; MG/1
CAPSULE, EXTENDED RELEASE ORAL
COMMUNITY
Start: 2021-11-04 | End: 2022-12-23

## 2021-11-18 ASSESSMENT — MIFFLIN-ST. JEOR: SCORE: 1559.43

## 2021-11-18 NOTE — PROGRESS NOTES
Chapincito Menjivar is a 41 year old male presents for routine health maintenance.    Current concerns:   ADD:  Recently started with Adderall XR 15 mg.     Body mass index is 22.96 kg/m .    Present exercise habits:  3-5 times/week  Present dietary habits:  eats regular meals and follows a balanced nutrition diet    Vit D intake: is not taking supplement    Is the patient a smoker? No  If yes, smoking cessation advised and counseling provided.     Cardiovascular risk factors: none    Over the past few weeks, have you felt down or depressed? Little interest or pleasure in doing things? No concerns    Last dental appointment:  this year  Last optical appointment:  this year    Was the patient born between [birth year] and has not had Hep C testing?  Has not been tested, declines testing    I have reviewed the following histories: Past Medical History, Past Surgical History, Social History, Family History, Problem List, Medication List and Allergies    Past Medical History:   Diagnosis Date     Anxiety      Dislocated shoulder     left     Gastroesophageal reflux disease      Family History   Problem Relation Age of Onset     Hypothyroidism Mother         ???     Cataracts Father      Gestational Diabetes Sister      Diabetes Sister      No Known Problems Brother      Esophageal Cancer Sister         40     No Known Problems Maternal Grandmother      No Known Problems Maternal Grandfather      Cancer Paternal Grandmother         unknown     No Known Problems Other      No Known Problems Paternal Grandfather      Hyperlipidemia No family hx of      Hypertension No family hx of      Colon Cancer No family hx of      Prostate Cancer No family hx of      Coronary Artery Disease No family hx of      Heart Disease No family hx of      Kidney Disease No family hx of      Cerebrovascular Disease No family hx of      Obesity No family hx of      Thrombosis No family hx of      Asthma No family hx of      Arthritis No family hx of       Thyroid Disease No family hx of      Depression No family hx of      Mental Illness No family hx of      Substance Abuse No family hx of      Cystic Fibrosis No family hx of      Early Death No family hx of      Coronary Artery Disease Early Onset No family hx of      Heart Failure No family hx of      Bleeding Diathesis No family hx of      Dementia No family hx of      Breast Cancer No family hx of      Ovarian Cancer No family hx of      Uterine Cancer No family hx of      Colorectal Cancer No family hx of      Pancreatic Cancer No family hx of      Lung Cancer No family hx of      Melanoma No family hx of      Autoimmune Disease No family hx of      Unknown/Adopted No family hx of      Genetic Disorder No family hx of      Social History     Socioeconomic History     Marital status:      Spouse name: Not on file     Number of children: Not on file     Years of education: Not on file     Highest education level: Not on file   Occupational History     Occupation:    Tobacco Use     Smoking status: Never Smoker     Smokeless tobacco: Never Used   Substance and Sexual Activity     Alcohol use: Yes     Alcohol/week: 1.0 standard drink     Types: 1 Standard drinks or equivalent per week     Comment: Socially      Drug use: No     Sexual activity: Yes     Partners: Female     Birth control/protection: Natural Family Planning     Comment: wife   Other Topics Concern     Not on file   Social History Narrative     Not on file     Social Determinants of Health     Financial Resource Strain: Not on file   Food Insecurity: Not on file   Transportation Needs: Not on file   Physical Activity: Not on file   Stress: Not on file   Social Connections: Not on file   Intimate Partner Violence: Not on file   Housing Stability: Not on file     Patient Active Problem List   Diagnosis     Health Care Home     ACP (advance care planning)     CORI (generalized anxiety disorder)     Hair loss     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       Allergies:  No Known Allergies      ROS:  E/M: NEGATIVE for ear, nose, mouth and throat problems  R: NEGATIVE for significant/chronic cough or SOB  CV: NEGATIVE for chest pain or palpitations  GI: NEGATIVE for abdominal pain, chronic diarrhea or constipation  : NEGATIVE for dysuria, hematuria, weakened urinary stream      OBJECTIVE:    Vitals:    11/18/21 1311   BP: 110/68   BP Location: Right arm   Patient Position: Sitting   Cuff Size: Adult Large   Pulse: 69   Temp: 97.8  F (36.6  C)   TempSrc: Temporal   SpO2: 98%   Weight: 68.5 kg (151 lb)   Height: 1.727 m (5' 8\")       General: 41 year old male who appears his stated age. Vital signs noted.  Head: Normocephalic  Eyes: pupils equal round reactive to light and accomodation, extra ocular movements intact  Ears: external canals and tms free of abnormalities  Nose: patent, without mucosal abnormalities  Mouth and throat: without erythema or lesions of the mucosa  Neck: supple, without adenopathy or thyromegaly  Lungs: clear to auscultation, no wheezing or crackles  Cv: regular rate and rhythm, normal s1 and s2 without murmur or click  Abd: soft, non-tender, no masses, no hepatomegaly or splenomegaly.  Gu: normal external genitalia, no hernia  Rectal: No concerns  Ms: normal muscle tone & symmetry  Skin: Clear to inspection  Neuro: sensation and motor function grossly intact; cranial nerves without obvious abnormalities.      ASSESSMENT/PLAN:    Encounter for general medical examination  Could consider fasting labs, but agreed that he can wait until next year.     Erectile dysfunction, unspecified erectile dysfunction type  Working well. Will refill as needed.   - sildenafil (VIAGRA) 100 MG tablet; Take " "0.5-1 ( mg) tablets by mouth 1 hour prior to intercourse. Max dose: 2 tabs/24 hours.    Attention deficit disorder (ADD) without hyperactivity     reports that he has never smoked. He has never used smokeless tobacco.    Estimated body mass index is 22.96 kg/m  as calculated from the following:    Height as of this encounter: 1.727 m (5' 8\").    Weight as of this encounter: 68.5 kg (151 lb).    Labs pending:   Meds Suggested:      Vitamin D       Calcium    The patient will return to the clinic if symptoms are changing or concern with follow up as discussed. The patient understands and agrees with the plan.    Chandrika Duran PA-C  11/18/2021      Counseling Resources:  ATP IV Guidelines  Pooled Cohorts Equation Calculator  Breast Cancer Risk Calculator  FRAX Risk Assessment  ICSI Preventive Guidelines  Dietary Guidelines for Americans, 2010  [employer]'s MyPlate    "

## 2021-12-08 ENCOUNTER — IMMUNIZATION (OUTPATIENT)
Dept: NURSING | Facility: CLINIC | Age: 41
End: 2021-12-08
Payer: COMMERCIAL

## 2021-12-08 PROCEDURE — 0004A PR COVID VAC PFIZER DIL RECON 30 MCG/0.3 ML IM: CPT

## 2021-12-08 PROCEDURE — 91300 PR COVID VAC PFIZER DIL RECON 30 MCG/0.3 ML IM: CPT

## 2022-12-23 ENCOUNTER — OFFICE VISIT (OUTPATIENT)
Dept: FAMILY MEDICINE | Facility: CLINIC | Age: 42
End: 2022-12-23

## 2022-12-23 VITALS
SYSTOLIC BLOOD PRESSURE: 130 MMHG | BODY MASS INDEX: 23.19 KG/M2 | OXYGEN SATURATION: 96 % | HEART RATE: 71 BPM | TEMPERATURE: 97.2 F | DIASTOLIC BLOOD PRESSURE: 70 MMHG | HEIGHT: 68 IN | WEIGHT: 153 LBS

## 2022-12-23 DIAGNOSIS — Z13.228 SCREENING FOR METABOLIC DISORDER: ICD-10-CM

## 2022-12-23 DIAGNOSIS — Z00.00 ENCOUNTER FOR GENERAL HEALTH EXAMINATION: Primary | ICD-10-CM

## 2022-12-23 DIAGNOSIS — Z13.220 SCREENING FOR LIPID DISORDERS: ICD-10-CM

## 2022-12-23 LAB
ALBUMIN SERPL-MCNC: 4.9 G/DL (ref 3.6–5.1)
ALBUMIN/GLOB SERPL: 1.9 {RATIO} (ref 1–2.5)
ALP SERPL-CCNC: 71 U/L (ref 33–130)
ALT 1742-6: 13 U/L (ref 0–32)
AST 1920-8: 16 U/L (ref 0–35)
BILIRUB SERPL-MCNC: 1 MG/DL (ref 0.2–1.2)
BUN SERPL-MCNC: 13 MG/DL (ref 7–25)
BUN/CREATININE RATIO: 11.7 (ref 6–22)
CALCIUM SERPL-MCNC: 9.8 MG/DL (ref 8.6–10.3)
CHLORIDE SERPLBLD-SCNC: 101.2 MMOL/L (ref 98–110)
CHOLEST SERPL-MCNC: 264 MG/DL (ref 0–199)
CHOLEST/HDLC SERPL: 4 {RATIO} (ref 0–5)
CO2 SERPL-SCNC: 31.7 MMOL/L (ref 20–32)
CREAT SERPL-MCNC: 1.11 MG/DL (ref 0.6–1.3)
GLOBULIN, CALCULATED - QUEST: 2.6 (ref 1.9–3.7)
GLUCOSE SERPL-MCNC: 94 MG/DL (ref 60–99)
HDLC SERPL-MCNC: 61 MG/DL (ref 40–150)
LDLC SERPL CALC-MCNC: 191 MG/DL (ref 0–130)
POTASSIUM SERPL-SCNC: 4.98 MMOL/L (ref 3.5–5.3)
PROT SERPL-MCNC: 7.5 G/DL (ref 6.1–8.1)
SODIUM SERPL-SCNC: 139.4 MMOL/L (ref 135–146)
TRIGL SERPL-MCNC: 58 MG/DL (ref 0–149)

## 2022-12-23 PROCEDURE — 99396 PREV VISIT EST AGE 40-64: CPT | Performed by: PHYSICIAN ASSISTANT

## 2022-12-23 PROCEDURE — 80053 COMPREHEN METABOLIC PANEL: CPT | Performed by: PHYSICIAN ASSISTANT

## 2022-12-23 PROCEDURE — 80061 LIPID PANEL: CPT | Performed by: PHYSICIAN ASSISTANT

## 2022-12-23 PROCEDURE — 36415 COLL VENOUS BLD VENIPUNCTURE: CPT | Performed by: PHYSICIAN ASSISTANT

## 2022-12-23 RX ORDER — ESCITALOPRAM OXALATE 10 MG/1
10 TABLET ORAL DAILY
COMMUNITY
Start: 2022-11-13

## 2022-12-23 RX ORDER — DEXTROAMPHETAMINE SACCHARATE, AMPHETAMINE ASPARTATE MONOHYDRATE, DEXTROAMPHETAMINE SULFATE AND AMPHETAMINE SULFATE 7.5; 7.5; 7.5; 7.5 MG/1; MG/1; MG/1; MG/1
30 CAPSULE, EXTENDED RELEASE ORAL DAILY
COMMUNITY
Start: 2022-11-13

## 2022-12-23 RX ORDER — PROPRANOLOL HYDROCHLORIDE 20 MG/1
20 TABLET ORAL 2 TIMES DAILY PRN
COMMUNITY
Start: 2022-11-13

## 2022-12-23 ASSESSMENT — ANXIETY QUESTIONNAIRES
IF YOU CHECKED OFF ANY PROBLEMS ON THIS QUESTIONNAIRE, HOW DIFFICULT HAVE THESE PROBLEMS MADE IT FOR YOU TO DO YOUR WORK, TAKE CARE OF THINGS AT HOME, OR GET ALONG WITH OTHER PEOPLE: SOMEWHAT DIFFICULT
1. FEELING NERVOUS, ANXIOUS, OR ON EDGE: SEVERAL DAYS
GAD7 TOTAL SCORE: 9
5. BEING SO RESTLESS THAT IT IS HARD TO SIT STILL: MORE THAN HALF THE DAYS
2. NOT BEING ABLE TO STOP OR CONTROL WORRYING: SEVERAL DAYS
3. WORRYING TOO MUCH ABOUT DIFFERENT THINGS: SEVERAL DAYS
6. BECOMING EASILY ANNOYED OR IRRITABLE: MORE THAN HALF THE DAYS
GAD7 TOTAL SCORE: 9
7. FEELING AFRAID AS IF SOMETHING AWFUL MIGHT HAPPEN: NOT AT ALL

## 2022-12-23 ASSESSMENT — PATIENT HEALTH QUESTIONNAIRE - PHQ9
5. POOR APPETITE OR OVEREATING: MORE THAN HALF THE DAYS
SUM OF ALL RESPONSES TO PHQ QUESTIONS 1-9: 3

## 2022-12-23 NOTE — PROGRESS NOTES
Chapincito Menjivar is a 42 year old male presents for routine health maintenance.    Current concerns:   Anxiety, ADD managed through [facility]. Dr. Abrams. Every 3 months. Stable on Adderall, escitalopram, propranolol.     Body mass index is 23.09 kg/m .    Present exercise habits:  3-5 times/week  Present dietary habits:  eats regular meals and follows a balanced nutrition diet    Vit D intake: is not taking supplement    Is the patient a smoker? No  If yes, smoking cessation advised and counseling provided.     Cardiovascular risk factors: none    Over the past few weeks, have you felt down or depressed? Little interest or pleasure in doing things? No concerns    Last dental appointment:  this year  Last optical appointment:  2 years ago    Was the patient born between [id number] and has not had Hep C testing?  No, not applicable    I have reviewed the following histories: Past Medical History, Past Surgical History, Social History, Family History, Problem List, Medication List and Allergies    Past Medical History:   Diagnosis Date     Anxiety      Dislocated shoulder     left     Gastroesophageal reflux disease      Family History   Problem Relation Age of Onset     Hypothyroidism Mother         ???     Cataracts Father      Gestational Diabetes Sister      Diabetes Sister      No Known Problems Brother      Esophageal Cancer Sister         40     No Known Problems Maternal Grandmother      No Known Problems Maternal Grandfather      Cancer Paternal Grandmother         unknown     No Known Problems Other      No Known Problems Paternal Grandfather      Hyperlipidemia No family hx of      Hypertension No family hx of      Colon Cancer No family hx of      Prostate Cancer No family hx of      Coronary Artery Disease No family hx of      Heart Disease No family hx of      Kidney Disease No family hx of      Cerebrovascular Disease No family hx of      Obesity No family hx of      Thrombosis No family hx of       Asthma No family hx of      Arthritis No family hx of      Thyroid Disease No family hx of      Depression No family hx of      Mental Illness No family hx of      Substance Abuse No family hx of      Cystic Fibrosis No family hx of      Early Death No family hx of      Coronary Artery Disease Early Onset No family hx of      Heart Failure No family hx of      Bleeding Diathesis No family hx of      Dementia No family hx of      Breast Cancer No family hx of      Ovarian Cancer No family hx of      Uterine Cancer No family hx of      Colorectal Cancer No family hx of      Pancreatic Cancer No family hx of      Lung Cancer No family hx of      Melanoma No family hx of      Autoimmune Disease No family hx of      Unknown/Adopted No family hx of      Genetic Disorder No family hx of      Social History     Socioeconomic History     Marital status:      Spouse name: Not on file     Number of children: Not on file     Years of education: Not on file     Highest education level: Not on file   Occupational History     Occupation:    Tobacco Use     Smoking status: Never     Smokeless tobacco: Never   Substance and Sexual Activity     Alcohol use: Yes     Alcohol/week: 1.0 standard drink     Types: 1 Standard drinks or equivalent per week     Comment: Socially      Drug use: No     Sexual activity: Yes     Partners: Female     Birth control/protection: Natural Family Planning     Comment: wife   Other Topics Concern     Not on file   Social History Narrative     Not on file     Social Determinants of Health     Financial Resource Strain: Not on file   Food Insecurity: Not on file   Transportation Needs: Not on file   Physical Activity: Not on file   Stress: Not on file   Social Connections: Not on file   Intimate Partner Violence: Not on file   Housing Stability: Not on file     Patient Active Problem List   Diagnosis     Health Care Home     ACP (advance care planning)     CORI (generalized anxiety  "disorder)     Hair loss     Attention deficit disorder (ADD) without hyperactivity     Current Outpatient Medications   Medication     amphetamine-dextroamphetamine (ADDERALL XR) 30 MG 24 hr capsule     escitalopram (LEXAPRO) 10 MG tablet     propranolol (INDERAL) 20 MG tablet     sildenafil (VIAGRA) 100 MG tablet     No current facility-administered medications for this visit.       Allergies:  No Known Allergies      ROS:  E/M: NEGATIVE for ear, nose, mouth and throat problems  R: NEGATIVE for significant/chronic cough or SOB  CV: NEGATIVE for chest pain or palpitations  GI: NEGATIVE for abdominal pain, chronic diarrhea or constipation  : NEGATIVE for dysuria, hematuria, weakened urinary stream      OBJECTIVE:    Vitals:    12/23/22 1407   BP: 130/70   BP Location: Left arm   Patient Position: Sitting   Cuff Size: Adult Large   Pulse: 71   Temp: 97.2  F (36.2  C)   TempSrc: Temporal   SpO2: 96%   Weight: 69.4 kg (153 lb)   Height: 1.734 m (5' 8.25\")       General: 42 year old male who appears his stated age. Vital signs noted  Head: Normocephalic  Eyes: pupils equal round reactive to light and accomodation, extra ocular movements intact  Ears: external canals and tms free of abnormalities  Nose: patent, without mucosal abnormalities  Mouth and throat: without erythema or lesions of the mucosa  Neck: supple, without adenopathy or thyromegaly  Lungs: clear to auscultation, no wheezing or crackles  Cv: regular rate and rhythm, normal s1 and s2 without murmur or click  Abd: soft, non-tender, no masses, no hepatomegaly or splenomegaly.  Gu: normal external genitalia, no hernia  Rectal: no masses, prostate normal size and density.  Ms: normal muscle tone & symmetry  Skin: Clear to inspection  Neuro: sensation and motor function grossly intact; cranial nerves without obvious abnormalities.    ASSESSMENT/PLAN:    Encounter for general health examination  Chuckie is doing well today. Will update fasting labs and send MyChart " "with results when available.     Screening for lipid disorders  - VENOUS COLLECTION  - Lipid Panel ([facility])    Screening for metabolic disorder  - VENOUS COLLECTION  - Comprehensive Metobolic Panel ([facility])     reports that he has never smoked. He has never used smokeless tobacco.    Estimated body mass index is 23.09 kg/m  as calculated from the following:    Height as of this encounter: 1.734 m (5' 8.25\").    Weight as of this encounter: 69.4 kg (153 lb).    Labs pending:   Meds Suggested:      Vitamin D       Calcium  Tests Recommended:      Regular Dental Examinations        Eye exam  Behavior Modifications:       Cardiovascular exercise 3 times per week--enough to get your Target Heart rate  Other recommendations:    Health Care directive     BMI noted and discussed      Regular testicle exam     Encouraged My Chart    The patient will return to the clinic if symptoms are changing or concern with follow up as discussed. The patient understands and agrees with the plan.    Chandrika Duran PA-C  12/23/2022    Counseling Resources:  ATP IV Guidelines  Pooled Cohorts Equation Calculator  Breast Cancer Risk Calculator  FRAX Risk Assessment  ICSI Preventive Guidelines  Dietary Guidelines for Americans, 2010  "[employer]'s MyPlate    "

## 2022-12-23 NOTE — NURSING NOTE
Chief Complaint   Patient presents with     Physical     Fasting cpx         Pre-visit Screening:  Immunizations:  up to date  Colonoscopy:  NA  Mammogram: NA  Asthma Action Test/Plan:  NA  PHQ9:  Done today  GAD7:  Done today  Questioned patient about current smoking habits Pt. has never smoked.  Ok to leave detailed message on voice mail for today's visit only Yes, phone # [phone]

## 2024-02-10 ENCOUNTER — HEALTH MAINTENANCE LETTER (OUTPATIENT)
Age: 44
End: 2024-02-10

## 2024-06-25 NOTE — NURSING NOTE
Chief Complaint   Patient presents with     Physical     Complete Physical Exam/ New Patient      Be Gunter, [facility]      
None known

## 2025-03-08 ENCOUNTER — HEALTH MAINTENANCE LETTER (OUTPATIENT)
Age: 45
End: 2025-03-08